# Patient Record
Sex: FEMALE | Race: OTHER | HISPANIC OR LATINO | URBAN - METROPOLITAN AREA
[De-identification: names, ages, dates, MRNs, and addresses within clinical notes are randomized per-mention and may not be internally consistent; named-entity substitution may affect disease eponyms.]

---

## 2019-10-02 ENCOUNTER — EMERGENCY (EMERGENCY)
Facility: HOSPITAL | Age: 34
LOS: 1 days | Discharge: HOME | End: 2019-10-02
Attending: EMERGENCY MEDICINE | Admitting: EMERGENCY MEDICINE
Payer: MEDICAID

## 2019-10-02 VITALS
OXYGEN SATURATION: 99 % | RESPIRATION RATE: 18 BRPM | TEMPERATURE: 98 F | SYSTOLIC BLOOD PRESSURE: 115 MMHG | HEART RATE: 79 BPM | DIASTOLIC BLOOD PRESSURE: 81 MMHG

## 2019-10-02 LAB — HCG UR QL: NEGATIVE — SIGNIFICANT CHANGE UP

## 2019-10-02 PROCEDURE — 73110 X-RAY EXAM OF WRIST: CPT

## 2019-10-02 PROCEDURE — 96372 THER/PROPH/DIAG INJ SC/IM: CPT

## 2019-10-02 PROCEDURE — 99284 EMERGENCY DEPT VISIT MOD MDM: CPT

## 2019-10-02 PROCEDURE — 81025 URINE PREGNANCY TEST: CPT

## 2019-10-02 PROCEDURE — 73110 X-RAY EXAM OF WRIST: CPT | Mod: 26,RT

## 2019-10-02 PROCEDURE — 99283 EMERGENCY DEPT VISIT LOW MDM: CPT | Mod: 25

## 2019-10-02 RX ORDER — ACETAMINOPHEN 500 MG
2 TABLET ORAL
Qty: 30 | Refills: 0
Start: 2019-10-02

## 2019-10-02 RX ORDER — ACETAMINOPHEN 500 MG
650 TABLET ORAL ONCE
Refills: 0 | Status: COMPLETED | OUTPATIENT
Start: 2019-10-02 | End: 2019-10-02

## 2019-10-02 RX ORDER — KETOROLAC TROMETHAMINE 30 MG/ML
30 SYRINGE (ML) INJECTION ONCE
Refills: 0 | Status: DISCONTINUED | OUTPATIENT
Start: 2019-10-02 | End: 2019-10-02

## 2019-10-02 RX ORDER — IBUPROFEN 200 MG
600 TABLET ORAL ONCE
Refills: 0 | Status: DISCONTINUED | OUTPATIENT
Start: 2019-10-02 | End: 2019-10-02

## 2019-10-02 RX ADMIN — Medication 650 MILLIGRAM(S): at 21:57

## 2019-10-02 RX ADMIN — Medication 30 MILLIGRAM(S): at 21:57

## 2019-10-02 NOTE — ED ADULT NURSE NOTE - OBJECTIVE STATEMENT
33 yr old female presents to the ED with c/o right hand pain. A+O x 4. anxious. reports that she lifted heavy boxes at work. Limited ROM in the RUE. No swelling, bleeding, or deformity noted.

## 2019-10-02 NOTE — ED PROVIDER NOTE - OBJECTIVE STATEMENT
34 yo F presents to ED c/o sudden R wrist pain x today. 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

## 2019-10-02 NOTE — ED PROVIDER NOTE - NSFOLLOWUPINSTRUCTIONS_ED_ALL_ED_FT
Follow up with ortho/hand within 1 week. Wear splint. Ice packs throughout the day. Take medication as directed. Return to the ED immediately for new or worsening symptoms.

## 2019-10-02 NOTE — ED ADULT NURSE NOTE - CHPI ED NUR SYMPTOMS NEG
no stiffness/no deformity/no tingling/no abrasion/no difficulty bearing weight/no fever/no weakness/no numbness/no bruising/no back pain

## 2019-10-02 NOTE — ED PROVIDER NOTE - PATIENT PORTAL LINK FT
You can access the FollowMyHealth Patient Portal offered by [facility] by registering at the following website: [email or web address]. By joining [employer]’s FollowMyHealth portal, you will also be able to view your health information using other applications (apps) compatible with our system.

## 2019-10-02 NOTE — ED PROVIDER NOTE - CLINICAL SUMMARY MEDICAL DECISION MAKING FREE TEXT BOX
34 yo F p/w R wrist pain--> likely tenosynovitis-- xray r/o FX, analgesia, d/c with wrist splint for ortho f/u 34 yo F p/w R wrist pain--> likely tendonitis-- xray r/o FX, analgesia, d/c with wrist splint for ortho f/u

## 2020-01-07 ENCOUNTER — EMERGENCY (EMERGENCY)
Facility: HOSPITAL | Age: 35
LOS: 1 days | Discharge: HOME | End: 2020-01-07
Attending: EMERGENCY MEDICINE | Admitting: EMERGENCY MEDICINE
Payer: MEDICAID

## 2020-01-07 VITALS
SYSTOLIC BLOOD PRESSURE: 141 MMHG | DIASTOLIC BLOOD PRESSURE: 78 MMHG | RESPIRATION RATE: 15 BRPM | TEMPERATURE: 98 F | HEART RATE: 74 BPM | OXYGEN SATURATION: 99 %

## 2020-01-07 VITALS
HEIGHT: 65 IN | HEART RATE: 76 BPM | DIASTOLIC BLOOD PRESSURE: 82 MMHG | WEIGHT: 175.05 LBS | OXYGEN SATURATION: 98 % | TEMPERATURE: 98 F | RESPIRATION RATE: 16 BRPM | SYSTOLIC BLOOD PRESSURE: 138 MMHG

## 2020-01-07 LAB
ALBUMIN SERPL ELPH-MCNC: 3.4 G/DL — SIGNIFICANT CHANGE UP (ref 3.3–5)
ALP SERPL-CCNC: 75 U/L — SIGNIFICANT CHANGE UP (ref 40–120)
ALT FLD-CCNC: 29 U/L — SIGNIFICANT CHANGE UP (ref 12–78)
ANION GAP SERPL CALC-SCNC: 7 MMOL/L — SIGNIFICANT CHANGE UP (ref 5–17)
APPEARANCE UR: CLEAR — SIGNIFICANT CHANGE UP
AST SERPL-CCNC: 22 U/L — SIGNIFICANT CHANGE UP (ref 15–37)
BASOPHILS # BLD AUTO: 0.04 K/UL — SIGNIFICANT CHANGE UP (ref 0–0.2)
BASOPHILS NFR BLD AUTO: 0.4 % — SIGNIFICANT CHANGE UP (ref 0–2)
BILIRUB SERPL-MCNC: 0.5 MG/DL — SIGNIFICANT CHANGE UP (ref 0.2–1.2)
BILIRUB UR-MCNC: NEGATIVE — SIGNIFICANT CHANGE UP
BUN SERPL-MCNC: 9 MG/DL — SIGNIFICANT CHANGE UP (ref 7–23)
CALCIUM SERPL-MCNC: 8.8 MG/DL — SIGNIFICANT CHANGE UP (ref 8.5–10.1)
CHLORIDE SERPL-SCNC: 106 MMOL/L — SIGNIFICANT CHANGE UP (ref 96–108)
CO2 SERPL-SCNC: 27 MMOL/L — SIGNIFICANT CHANGE UP (ref 22–31)
COLOR SPEC: YELLOW — SIGNIFICANT CHANGE UP
CREAT SERPL-MCNC: 0.73 MG/DL — SIGNIFICANT CHANGE UP (ref 0.5–1.3)
DIFF PNL FLD: ABNORMAL
EOSINOPHIL # BLD AUTO: 0.06 K/UL — SIGNIFICANT CHANGE UP (ref 0–0.5)
EOSINOPHIL NFR BLD AUTO: 0.6 % — SIGNIFICANT CHANGE UP (ref 0–6)
GLUCOSE SERPL-MCNC: 94 MG/DL — SIGNIFICANT CHANGE UP (ref 70–99)
GLUCOSE UR QL: NEGATIVE — SIGNIFICANT CHANGE UP
HCG SERPL-ACNC: 1384 MIU/ML — HIGH
HCT VFR BLD CALC: 43.2 % — SIGNIFICANT CHANGE UP (ref 34.5–45)
HGB BLD-MCNC: 14.3 G/DL — SIGNIFICANT CHANGE UP (ref 11.5–15.5)
IMM GRANULOCYTES NFR BLD AUTO: 0.6 % — SIGNIFICANT CHANGE UP (ref 0–1.5)
KETONES UR-MCNC: NEGATIVE — SIGNIFICANT CHANGE UP
LEUKOCYTE ESTERASE UR-ACNC: NEGATIVE — SIGNIFICANT CHANGE UP
LYMPHOCYTES # BLD AUTO: 1.67 K/UL — SIGNIFICANT CHANGE UP (ref 1–3.3)
LYMPHOCYTES # BLD AUTO: 15.3 % — SIGNIFICANT CHANGE UP (ref 13–44)
MCHC RBC-ENTMCNC: 28.9 PG — SIGNIFICANT CHANGE UP (ref 27–34)
MCHC RBC-ENTMCNC: 33.1 GM/DL — SIGNIFICANT CHANGE UP (ref 32–36)
MCV RBC AUTO: 87.4 FL — SIGNIFICANT CHANGE UP (ref 80–100)
MONOCYTES # BLD AUTO: 0.66 K/UL — SIGNIFICANT CHANGE UP (ref 0–0.9)
MONOCYTES NFR BLD AUTO: 6.1 % — SIGNIFICANT CHANGE UP (ref 2–14)
NEUTROPHILS # BLD AUTO: 8.38 K/UL — HIGH (ref 1.8–7.4)
NEUTROPHILS NFR BLD AUTO: 77 % — SIGNIFICANT CHANGE UP (ref 43–77)
NITRITE UR-MCNC: NEGATIVE — SIGNIFICANT CHANGE UP
NRBC # BLD: 0 /100 WBCS — SIGNIFICANT CHANGE UP (ref 0–0)
PH UR: 8 — SIGNIFICANT CHANGE UP (ref 5–8)
PLATELET # BLD AUTO: 253 K/UL — SIGNIFICANT CHANGE UP (ref 150–400)
POTASSIUM SERPL-MCNC: 4.1 MMOL/L — SIGNIFICANT CHANGE UP (ref 3.5–5.3)
POTASSIUM SERPL-SCNC: 4.1 MMOL/L — SIGNIFICANT CHANGE UP (ref 3.5–5.3)
PROT SERPL-MCNC: 7.4 G/DL — SIGNIFICANT CHANGE UP (ref 6–8.3)
PROT UR-MCNC: NEGATIVE — SIGNIFICANT CHANGE UP
RBC # BLD: 4.94 M/UL — SIGNIFICANT CHANGE UP (ref 3.8–5.2)
RBC # FLD: 13.1 % — SIGNIFICANT CHANGE UP (ref 10.3–14.5)
SODIUM SERPL-SCNC: 140 MMOL/L — SIGNIFICANT CHANGE UP (ref 135–145)
SP GR SPEC: 1.01 — SIGNIFICANT CHANGE UP (ref 1.01–1.02)
UROBILINOGEN FLD QL: NEGATIVE — SIGNIFICANT CHANGE UP
WBC # BLD: 10.88 K/UL — HIGH (ref 3.8–10.5)
WBC # FLD AUTO: 10.88 K/UL — HIGH (ref 3.8–10.5)

## 2020-01-07 PROCEDURE — 81001 URINALYSIS AUTO W/SCOPE: CPT

## 2020-01-07 PROCEDURE — 80053 COMPREHEN METABOLIC PANEL: CPT

## 2020-01-07 PROCEDURE — 85027 COMPLETE CBC AUTOMATED: CPT

## 2020-01-07 PROCEDURE — 76801 OB US < 14 WKS SINGLE FETUS: CPT | Mod: 26

## 2020-01-07 PROCEDURE — 76802 OB US < 14 WKS ADDL FETUS: CPT

## 2020-01-07 PROCEDURE — 36415 COLL VENOUS BLD VENIPUNCTURE: CPT

## 2020-01-07 PROCEDURE — 86900 BLOOD TYPING SEROLOGIC ABO: CPT

## 2020-01-07 PROCEDURE — 86901 BLOOD TYPING SEROLOGIC RH(D): CPT

## 2020-01-07 PROCEDURE — 84702 CHORIONIC GONADOTROPIN TEST: CPT

## 2020-01-07 PROCEDURE — 99284 EMERGENCY DEPT VISIT MOD MDM: CPT | Mod: 25

## 2020-01-07 PROCEDURE — 86850 RBC ANTIBODY SCREEN: CPT

## 2020-01-07 PROCEDURE — 99284 EMERGENCY DEPT VISIT MOD MDM: CPT

## 2020-01-07 NOTE — ED ADULT NURSE NOTE - OBJECTIVE STATEMENT
Pt reports LMP 10/15/19, went to OB/GYN this past Friday and had US where she was told there was no fetal HB, pt was instructed to return to OB this Wednesday however she began bleeding vaginally and cramping today.

## 2020-01-07 NOTE — ED ADULT NURSE NOTE - NSSEPSISSUSPECTED_ED_A_ED
Can try flovent then.  But would be nice if we get another notice to find out what is covered, can PA if needed to figure this out.  Roberta Beaver MD     No

## 2020-01-07 NOTE — ED PROVIDER NOTE - NSFOLLOWUPINSTRUCTIONS_ED_ALL_ED_FT
Vaya a alicia doctora manana para mas evaluacion. Regrese inmediatamente a la cosme de emergencia si tengas mas sintomas o la condicion empeora.

## 2020-01-07 NOTE — ED ADULT NURSE NOTE - NSFALLRSKASSESSTYPE_ED_ALL_ED
Initial (On Arrival) no loss of consciousness, no gait abnormality, no headache, no sensory deficits, and no weakness.

## 2020-01-07 NOTE — ED PROVIDER NOTE - PATIENT PORTAL LINK FT
You can access the FollowMyHealth Patient Portal offered by [facility] by registering at the following website: [email or web address]. By joining [employer]’s FollowMyHealth portal, you will also be able to view your health information using other applications (apps) compatible with our system.

## 2020-01-07 NOTE — ED ADULT NURSE NOTE - NSSUHOSCREENINGYN_ED_ALL_ED
Yes - the patient is able to be screened
Patient wishes to leave the emergency department at this time.  The patient understands that they are leaving against medical advice despite the risk of missing a potentially serious diagnosis which may lead to injury, disability and/or death.  The patient demonstrates understanding of all risks, is awake and alert and demonstrates competence to make medical decisions.  I was unable to convince the patient to stay for further workup and monitoring.  The patient was given an opportunity to ask any questions.   The patient understands that they may return at any time if desired.  I discussed the importance of close, prompt medical follow-up.

## 2020-01-07 NOTE — ED PROVIDER NOTE - OBJECTIVE STATEMENT
35 yo F , 8 wks gestation, LMP 10/15/19, presents to ED c/o lower abdominal cramping and vaginal bleeding worsening x today. Pt reports abdominal cramping since she found out she was pregnant but pain worse today. Reports vaginal bleeding that began as spotting 4 days ago, but has gotten progressively heavier. Had US 4 days ago which was concerning for non-viable pregnancy/no HR. Pt was instructed to return for repeat US tomorrow. Here today for worsening symptoms. Denies urinary complaints, chest pain, SOB, dizziness.

## 2020-01-07 NOTE — ED PROVIDER NOTE - PROGRESS NOTE DETAILS
Workup consistent with fetal demise/inevitable . All results discussed at length with patient and her mother. All questions answered. Pt to f/u with her OB/GYN tomorrow. Pt well appearing, stable for DC home. No emergent concerns at this time.

## 2020-08-10 ENCOUNTER — EMERGENCY (EMERGENCY)
Facility: HOSPITAL | Age: 35
LOS: 0 days | Discharge: HOME | End: 2020-08-10
Attending: EMERGENCY MEDICINE
Payer: MEDICAID

## 2020-08-10 VITALS
HEART RATE: 79 BPM | DIASTOLIC BLOOD PRESSURE: 82 MMHG | SYSTOLIC BLOOD PRESSURE: 137 MMHG | OXYGEN SATURATION: 100 % | RESPIRATION RATE: 18 BRPM

## 2020-08-10 VITALS — WEIGHT: 162.92 LBS | HEIGHT: 67 IN

## 2020-08-10 DIAGNOSIS — R10.2 PELVIC AND PERINEAL PAIN: ICD-10-CM

## 2020-08-10 DIAGNOSIS — O99.89 OTHER SPECIFIED DISEASES AND CONDITIONS COMPLICATING PREGNANCY, CHILDBIRTH AND THE PUERPERIUM: ICD-10-CM

## 2020-08-10 LAB
ALBUMIN SERPL ELPH-MCNC: 3.4 G/DL — SIGNIFICANT CHANGE UP (ref 3.3–5)
ALP SERPL-CCNC: 73 U/L — SIGNIFICANT CHANGE UP (ref 40–120)
ALT FLD-CCNC: 26 U/L — SIGNIFICANT CHANGE UP (ref 12–78)
ANION GAP SERPL CALC-SCNC: 5 MMOL/L — SIGNIFICANT CHANGE UP (ref 5–17)
APPEARANCE UR: CLEAR — SIGNIFICANT CHANGE UP
AST SERPL-CCNC: 16 U/L — SIGNIFICANT CHANGE UP (ref 15–37)
BASOPHILS # BLD AUTO: 0.04 K/UL — SIGNIFICANT CHANGE UP (ref 0–0.2)
BASOPHILS NFR BLD AUTO: 0.3 % — SIGNIFICANT CHANGE UP (ref 0–2)
BILIRUB SERPL-MCNC: 0.3 MG/DL — SIGNIFICANT CHANGE UP (ref 0.2–1.2)
BILIRUB UR-MCNC: NEGATIVE — SIGNIFICANT CHANGE UP
BUN SERPL-MCNC: 10 MG/DL — SIGNIFICANT CHANGE UP (ref 7–23)
CALCIUM SERPL-MCNC: 8.6 MG/DL — SIGNIFICANT CHANGE UP (ref 8.5–10.1)
CHLORIDE SERPL-SCNC: 108 MMOL/L — SIGNIFICANT CHANGE UP (ref 96–108)
CO2 SERPL-SCNC: 26 MMOL/L — SIGNIFICANT CHANGE UP (ref 22–31)
COLOR SPEC: YELLOW — SIGNIFICANT CHANGE UP
CREAT SERPL-MCNC: 0.78 MG/DL — SIGNIFICANT CHANGE UP (ref 0.5–1.3)
DIFF PNL FLD: NEGATIVE — SIGNIFICANT CHANGE UP
EOSINOPHIL # BLD AUTO: 0.05 K/UL — SIGNIFICANT CHANGE UP (ref 0–0.5)
EOSINOPHIL NFR BLD AUTO: 0.4 % — SIGNIFICANT CHANGE UP (ref 0–6)
GLUCOSE SERPL-MCNC: 88 MG/DL — SIGNIFICANT CHANGE UP (ref 70–99)
GLUCOSE UR QL: NEGATIVE MG/DL — SIGNIFICANT CHANGE UP
HCG SERPL-ACNC: 383 MIU/ML — HIGH
HCT VFR BLD CALC: 40 % — SIGNIFICANT CHANGE UP (ref 34.5–45)
HGB BLD-MCNC: 13.7 G/DL — SIGNIFICANT CHANGE UP (ref 11.5–15.5)
HIV 1 & 2 AB SERPL IA.RAPID: SIGNIFICANT CHANGE UP
IMM GRANULOCYTES NFR BLD AUTO: 0.3 % — SIGNIFICANT CHANGE UP (ref 0–1.5)
KETONES UR-MCNC: NEGATIVE — SIGNIFICANT CHANGE UP
LEUKOCYTE ESTERASE UR-ACNC: NEGATIVE — SIGNIFICANT CHANGE UP
LYMPHOCYTES # BLD AUTO: 17.6 % — SIGNIFICANT CHANGE UP (ref 13–44)
LYMPHOCYTES # BLD AUTO: 2.09 K/UL — SIGNIFICANT CHANGE UP (ref 1–3.3)
MCHC RBC-ENTMCNC: 29.8 PG — SIGNIFICANT CHANGE UP (ref 27–34)
MCHC RBC-ENTMCNC: 34.3 GM/DL — SIGNIFICANT CHANGE UP (ref 32–36)
MCV RBC AUTO: 87 FL — SIGNIFICANT CHANGE UP (ref 80–100)
MONOCYTES # BLD AUTO: 0.87 K/UL — SIGNIFICANT CHANGE UP (ref 0–0.9)
MONOCYTES NFR BLD AUTO: 7.3 % — SIGNIFICANT CHANGE UP (ref 2–14)
NEUTROPHILS # BLD AUTO: 8.8 K/UL — HIGH (ref 1.8–7.4)
NEUTROPHILS NFR BLD AUTO: 74.1 % — SIGNIFICANT CHANGE UP (ref 43–77)
NITRITE UR-MCNC: NEGATIVE — SIGNIFICANT CHANGE UP
PH UR: 7 — SIGNIFICANT CHANGE UP (ref 5–8)
PLATELET # BLD AUTO: 244 K/UL — SIGNIFICANT CHANGE UP (ref 150–400)
POTASSIUM SERPL-MCNC: 3.8 MMOL/L — SIGNIFICANT CHANGE UP (ref 3.5–5.3)
POTASSIUM SERPL-SCNC: 3.8 MMOL/L — SIGNIFICANT CHANGE UP (ref 3.5–5.3)
PROT SERPL-MCNC: 7.5 GM/DL — SIGNIFICANT CHANGE UP (ref 6–8.3)
PROT UR-MCNC: NEGATIVE MG/DL — SIGNIFICANT CHANGE UP
RBC # BLD: 4.6 M/UL — SIGNIFICANT CHANGE UP (ref 3.8–5.2)
RBC # FLD: 12.8 % — SIGNIFICANT CHANGE UP (ref 10.3–14.5)
SODIUM SERPL-SCNC: 139 MMOL/L — SIGNIFICANT CHANGE UP (ref 135–145)
SP GR SPEC: 1.01 — SIGNIFICANT CHANGE UP (ref 1.01–1.02)
UROBILINOGEN FLD QL: NEGATIVE MG/DL — SIGNIFICANT CHANGE UP
WBC # BLD: 11.88 K/UL — HIGH (ref 3.8–10.5)
WBC # FLD AUTO: 11.88 K/UL — HIGH (ref 3.8–10.5)

## 2020-08-10 PROCEDURE — 76830 TRANSVAGINAL US NON-OB: CPT

## 2020-08-10 PROCEDURE — 87086 URINE CULTURE/COLONY COUNT: CPT

## 2020-08-10 PROCEDURE — 85025 COMPLETE CBC W/AUTO DIFF WBC: CPT

## 2020-08-10 PROCEDURE — 86900 BLOOD TYPING SEROLOGIC ABO: CPT

## 2020-08-10 PROCEDURE — 86850 RBC ANTIBODY SCREEN: CPT

## 2020-08-10 PROCEDURE — 36415 COLL VENOUS BLD VENIPUNCTURE: CPT

## 2020-08-10 PROCEDURE — 99284 EMERGENCY DEPT VISIT MOD MDM: CPT | Mod: 25

## 2020-08-10 PROCEDURE — 81003 URINALYSIS AUTO W/O SCOPE: CPT

## 2020-08-10 PROCEDURE — 76830 TRANSVAGINAL US NON-OB: CPT | Mod: 26

## 2020-08-10 PROCEDURE — 99285 EMERGENCY DEPT VISIT HI MDM: CPT

## 2020-08-10 PROCEDURE — 84702 CHORIONIC GONADOTROPIN TEST: CPT

## 2020-08-10 PROCEDURE — 86703 HIV-1/HIV-2 1 RESULT ANTBDY: CPT

## 2020-08-10 PROCEDURE — 86901 BLOOD TYPING SEROLOGIC RH(D): CPT

## 2020-08-10 PROCEDURE — 80053 COMPREHEN METABOLIC PANEL: CPT

## 2020-08-10 RX ORDER — ACETAMINOPHEN 500 MG
975 TABLET ORAL ONCE
Refills: 0 | Status: COMPLETED | OUTPATIENT
Start: 2020-08-10 | End: 2020-08-10

## 2020-08-10 NOTE — ED PROVIDER NOTE - NS ED ROS FT
General: no fever  Head: no headache  Eyes: no vision change  ENT: no nasal discharge/congestion, no sore throat  CV: no chest pain  Resp: no SOB, no cough  GI: no N/V/D  : no dysuria, no vaginal bleeding or discharge, +pelvic pain  MSK: no joint pain  Skin: no new rash  Neuro: no focal weakness, no change in sensation

## 2020-08-10 NOTE — ED PROVIDER NOTE - CLINICAL SUMMARY MEDICAL DECISION MAKING FREE TEXT BOX
35yo woman presents with concern for ectopic pregnancy with no IUP on ultrasound during OB visit today with LMP 4 weeks ago and with bilateral lower pelvic tenderness on exam. Consider ectopic vs early pregnancy. Pt is hemodynamically stable. Will obtain repeat TVUS and hcg levels and h/h. Will give pain medication continue to monitor and reassess.

## 2020-08-10 NOTE — ED PROVIDER NOTE - PATIENT PORTAL LINK FT
You can access the FollowMyHealth Patient Portal offered by [facility] by registering at the following website: [email or web address]. By joining [employer]’s FollowMyHealth portal, you will also be able to view your health information using other applications (apps) compatible with our system.

## 2020-08-10 NOTE — ED PROVIDER NOTE - CARE PLAN
Principal Discharge DX:	Lower abdominal pain Principal Discharge DX:	Pregnancy related condition in first trimester

## 2020-08-10 NOTE — ED PROVIDER NOTE - NSFOLLOWUPINSTRUCTIONS_ED_ALL_ED_FT
You were seen an evaluated in the emergency room for lower abdominal pain while pregnant.    Please follow up with your OB/gyn (Dr. Denny) on 8/12 for repeat blood work.    Take 650mg tylenol every 6 hours as needed for pain.    Continue all home medications as prescribed.    Seek care immediately if:   You have severe pain in your abdomen.   Your abdomen is larger than usual, very painful, and hard.   You have heavy vaginal bleeding.  You have chest pain or shortness of breath.  You feel weak, dizzy, or faint.  Or any worse or abnormal symptoms.     Please read all attached.

## 2020-08-10 NOTE — ED ADULT TRIAGE NOTE - CHIEF COMPLAINT QUOTE
pt sent in by md for possible ectopic pregnancy, pt states she is 8 weeks pregnant, abd pain started 4 days ago.  Denies vaginal bleeding.

## 2020-08-10 NOTE — ED PROVIDER NOTE - PHYSICAL EXAMINATION
General: well-appearing woman in no acute distress  Head: normocephalic, atraumatic  Eyes: PERRL  Neck: supple neck  CV: normal rate and rhythm, peripheral pulses 2+ bilaterally  Respiratory: clear to auscultation bilaterally  Abdomen: soft, nontender, nondistended  : tender to palpation of bilateral lower pelvic region, no CVAT  Neuro: alert and oriented x3, speech clear, moving all extremities without difficulty  Skin: no rash on abdomen  Extremities: no tenderness to palpation of joints

## 2020-08-10 NOTE — ED PROVIDER NOTE - CARE PROVIDER_API CALL
Lona Denny  OBSTETRICS AND GYNECOLOGY  [address] [place] [address]  Phone: [phone]  Fax: [phone]  Scheduled Appointment: 08/12/2020

## 2020-08-10 NOTE — ED PROVIDER NOTE - OBJECTIVE STATEMENT
33yo  LMP 20 with +home pregnancy test 2 days ago and evaluation with ultrasound today at OB office (Dr. Denny) with no IUP sent in for evaluation of ectopic pregnancy in setting of bilateral lower abdominal cramping pain x 4 days that worsens with intercourse. No vaginal bleeding or discharge. No n/v/d, no blood in stool. No chest pain or SOB, no lightheadedness. No dysuria. She took tylenol 4 days ago for the pain.   ID# [id number]

## 2020-08-10 NOTE — ED PROVIDER NOTE - PROGRESS NOTE DETAILS
Shahid Puga for attending Dr. Briseno: First trimester vaginal bleeding - hemodynamically stable & well appearing (other DDx: most likely threatened , other , implantation bleeding vs less likely torsion or molar pregnancy)  Plan: 1) LABS/UA/HCG/T&S.  2) Transvaginal US.  3) reassess.  4) OBGYN consult if needed. Kya Alonso, resident MD: hcg 383 and no IUP or finding on ultrasound. spoke with Dr. Denny and discussed results vitals wnl. She will see pt in office in 2 days for repeat hcg. will discharge patient home at this time. printed out copies of results for patient to take home. discussed return precautions and need for outpatient follow up. Kya Alonso, resident MD: hcg 383 and no IUP or finding on ultrasound. discussed results with pt. pt states resolution of abdominal pain at this time. spoke with Dr. Denny and discussed results and vitals wnl. She will see pt in office in 2 days for repeat hcg. will discharge patient home at this time. printed out copies of results for patient to take home. discussed return precautions and need for outpatient follow up.

## 2020-08-10 NOTE — ED ADULT NURSE NOTE - OBJECTIVE STATEMENT
pt sent in by md for possible ectopic pregnancy, pt states she is 8 weeks pregnant, abd pain started 4 days ago.  Denies vaginal bleeding. pain subsided on arrival.

## 2020-08-11 LAB
CULTURE RESULTS: SIGNIFICANT CHANGE UP
SPECIMEN SOURCE: SIGNIFICANT CHANGE UP

## 2020-10-12 ENCOUNTER — ASOB RESULT (OUTPATIENT)
Age: 35
End: 2020-10-12

## 2020-10-12 ENCOUNTER — APPOINTMENT (OUTPATIENT)
Dept: ANTEPARTUM | Facility: CLINIC | Age: 35
End: 2020-10-12
Payer: MEDICAID

## 2020-10-12 DIAGNOSIS — O35.1XX1 MATERNAL CARE FOR (SUSPECTED) CHROMOSOMAL ABNORMALITY IN FETUS, FETUS 1: ICD-10-CM

## 2020-10-12 PROCEDURE — 76813 OB US NUCHAL MEAS 1 GEST: CPT | Mod: 59

## 2020-10-12 PROCEDURE — 36416 COLLJ CAPILLARY BLOOD SPEC: CPT

## 2020-10-15 LAB
1ST TRIMESTER DATA: NORMAL
ADDENDUM DOC: NORMAL
AFP PNL SERPL: NORMAL
AFP SERPL-ACNC: NORMAL
CLINICAL BIOCHEMIST REVIEW: NORMAL
FREE BETA HCG 1ST TRIMESTER: NORMAL
Lab: NORMAL
NOTES NTD: NORMAL
NT: NORMAL
PAPP-A SERPL-ACNC: NORMAL
TRISOMY 18/3: NORMAL

## 2020-12-07 ENCOUNTER — APPOINTMENT (OUTPATIENT)
Dept: ANTEPARTUM | Facility: CLINIC | Age: 35
End: 2020-12-07
Payer: MEDICAID

## 2020-12-07 ENCOUNTER — ASOB RESULT (OUTPATIENT)
Age: 35
End: 2020-12-07

## 2020-12-07 PROCEDURE — 76805 OB US >/= 14 WKS SNGL FETUS: CPT

## 2020-12-07 PROCEDURE — 99072 ADDL SUPL MATRL&STAF TM PHE: CPT

## 2020-12-21 ENCOUNTER — ASOB RESULT (OUTPATIENT)
Age: 35
End: 2020-12-21

## 2020-12-21 ENCOUNTER — APPOINTMENT (OUTPATIENT)
Dept: ANTEPARTUM | Facility: CLINIC | Age: 35
End: 2020-12-21
Payer: MEDICAID

## 2020-12-21 PROCEDURE — 99072 ADDL SUPL MATRL&STAF TM PHE: CPT

## 2020-12-21 PROCEDURE — 76816 OB US FOLLOW-UP PER FETUS: CPT

## 2021-01-13 ENCOUNTER — EMERGENCY (EMERGENCY)
Facility: HOSPITAL | Age: 36
LOS: 0 days | Discharge: HOME | End: 2021-01-13
Attending: EMERGENCY MEDICINE
Payer: MEDICAID

## 2021-01-13 VITALS
DIASTOLIC BLOOD PRESSURE: 67 MMHG | OXYGEN SATURATION: 99 % | TEMPERATURE: 98 F | HEART RATE: 82 BPM | SYSTOLIC BLOOD PRESSURE: 120 MMHG | RESPIRATION RATE: 16 BRPM

## 2021-01-13 VITALS
HEART RATE: 90 BPM | DIASTOLIC BLOOD PRESSURE: 65 MMHG | SYSTOLIC BLOOD PRESSURE: 114 MMHG | TEMPERATURE: 99 F | RESPIRATION RATE: 18 BRPM | OXYGEN SATURATION: 100 %

## 2021-01-13 DIAGNOSIS — Z3A.26 26 WEEKS GESTATION OF PREGNANCY: ICD-10-CM

## 2021-01-13 DIAGNOSIS — R55 SYNCOPE AND COLLAPSE: ICD-10-CM

## 2021-01-13 LAB
ALBUMIN SERPL ELPH-MCNC: 2.5 G/DL — LOW (ref 3.3–5)
ALP SERPL-CCNC: 79 U/L — SIGNIFICANT CHANGE UP (ref 40–120)
ALT FLD-CCNC: 25 U/L — SIGNIFICANT CHANGE UP (ref 12–78)
ANION GAP SERPL CALC-SCNC: 5 MMOL/L — SIGNIFICANT CHANGE UP (ref 5–17)
APPEARANCE UR: CLEAR — SIGNIFICANT CHANGE UP
AST SERPL-CCNC: 36 U/L — SIGNIFICANT CHANGE UP (ref 15–37)
BASOPHILS # BLD AUTO: 0.04 K/UL — SIGNIFICANT CHANGE UP (ref 0–0.2)
BASOPHILS NFR BLD AUTO: 0.4 % — SIGNIFICANT CHANGE UP (ref 0–2)
BILIRUB SERPL-MCNC: 0.3 MG/DL — SIGNIFICANT CHANGE UP (ref 0.2–1.2)
BILIRUB UR-MCNC: NEGATIVE — SIGNIFICANT CHANGE UP
BUN SERPL-MCNC: 6 MG/DL — LOW (ref 7–23)
CALCIUM SERPL-MCNC: 8.2 MG/DL — LOW (ref 8.5–10.1)
CHLORIDE SERPL-SCNC: 110 MMOL/L — HIGH (ref 96–108)
CO2 SERPL-SCNC: 22 MMOL/L — SIGNIFICANT CHANGE UP (ref 22–31)
COLOR SPEC: YELLOW — SIGNIFICANT CHANGE UP
CREAT SERPL-MCNC: 0.61 MG/DL — SIGNIFICANT CHANGE UP (ref 0.5–1.3)
DIFF PNL FLD: NEGATIVE — SIGNIFICANT CHANGE UP
EOSINOPHIL # BLD AUTO: 0.07 K/UL — SIGNIFICANT CHANGE UP (ref 0–0.5)
EOSINOPHIL NFR BLD AUTO: 0.6 % — SIGNIFICANT CHANGE UP (ref 0–6)
GLUCOSE SERPL-MCNC: 81 MG/DL — SIGNIFICANT CHANGE UP (ref 70–99)
GLUCOSE UR QL: NEGATIVE MG/DL — SIGNIFICANT CHANGE UP
HCT VFR BLD CALC: 39.3 % — SIGNIFICANT CHANGE UP (ref 34.5–45)
HGB BLD-MCNC: 12.9 G/DL — SIGNIFICANT CHANGE UP (ref 11.5–15.5)
IMM GRANULOCYTES NFR BLD AUTO: 0.5 % — SIGNIFICANT CHANGE UP (ref 0–1.5)
KETONES UR-MCNC: NEGATIVE — SIGNIFICANT CHANGE UP
LEUKOCYTE ESTERASE UR-ACNC: NEGATIVE — SIGNIFICANT CHANGE UP
LYMPHOCYTES # BLD AUTO: 1.76 K/UL — SIGNIFICANT CHANGE UP (ref 1–3.3)
LYMPHOCYTES # BLD AUTO: 16 % — SIGNIFICANT CHANGE UP (ref 13–44)
MANUAL SMEAR VERIFICATION: SIGNIFICANT CHANGE UP
MCHC RBC-ENTMCNC: 28.6 PG — SIGNIFICANT CHANGE UP (ref 27–34)
MCHC RBC-ENTMCNC: 32.8 GM/DL — SIGNIFICANT CHANGE UP (ref 32–36)
MCV RBC AUTO: 87.1 FL — SIGNIFICANT CHANGE UP (ref 80–100)
MONOCYTES # BLD AUTO: 0.9 K/UL — SIGNIFICANT CHANGE UP (ref 0–0.9)
MONOCYTES NFR BLD AUTO: 8.2 % — SIGNIFICANT CHANGE UP (ref 2–14)
NEUTROPHILS # BLD AUTO: 8.15 K/UL — HIGH (ref 1.8–7.4)
NEUTROPHILS NFR BLD AUTO: 74.3 % — SIGNIFICANT CHANGE UP (ref 43–77)
NITRITE UR-MCNC: NEGATIVE — SIGNIFICANT CHANGE UP
PH UR: 6.5 — SIGNIFICANT CHANGE UP (ref 5–8)
PLAT MORPH BLD: NORMAL — SIGNIFICANT CHANGE UP
PLATELET # BLD AUTO: 200 K/UL — SIGNIFICANT CHANGE UP (ref 150–400)
POTASSIUM SERPL-MCNC: 4.3 MMOL/L — SIGNIFICANT CHANGE UP (ref 3.5–5.3)
POTASSIUM SERPL-SCNC: 4.3 MMOL/L — SIGNIFICANT CHANGE UP (ref 3.5–5.3)
PROT SERPL-MCNC: 6.9 GM/DL — SIGNIFICANT CHANGE UP (ref 6–8.3)
PROT UR-MCNC: NEGATIVE MG/DL — SIGNIFICANT CHANGE UP
RBC # BLD: 4.51 M/UL — SIGNIFICANT CHANGE UP (ref 3.8–5.2)
RBC # FLD: 13.9 % — SIGNIFICANT CHANGE UP (ref 10.3–14.5)
RBC BLD AUTO: NORMAL — SIGNIFICANT CHANGE UP
SODIUM SERPL-SCNC: 137 MMOL/L — SIGNIFICANT CHANGE UP (ref 135–145)
SP GR SPEC: 1.01 — SIGNIFICANT CHANGE UP (ref 1.01–1.02)
UROBILINOGEN FLD QL: NEGATIVE MG/DL — SIGNIFICANT CHANGE UP
WBC # BLD: 10.98 K/UL — HIGH (ref 3.8–10.5)
WBC # FLD AUTO: 10.98 K/UL — HIGH (ref 3.8–10.5)

## 2021-01-13 PROCEDURE — 93005 ELECTROCARDIOGRAM TRACING: CPT

## 2021-01-13 PROCEDURE — 80053 COMPREHEN METABOLIC PANEL: CPT

## 2021-01-13 PROCEDURE — 81003 URINALYSIS AUTO W/O SCOPE: CPT

## 2021-01-13 PROCEDURE — 99283 EMERGENCY DEPT VISIT LOW MDM: CPT

## 2021-01-13 PROCEDURE — 85025 COMPLETE CBC W/AUTO DIFF WBC: CPT

## 2021-01-13 PROCEDURE — 93010 ELECTROCARDIOGRAM REPORT: CPT

## 2021-01-13 PROCEDURE — 99285 EMERGENCY DEPT VISIT HI MDM: CPT

## 2021-01-13 PROCEDURE — 36415 COLL VENOUS BLD VENIPUNCTURE: CPT

## 2021-01-13 RX ORDER — SODIUM CHLORIDE 9 MG/ML
1000 INJECTION INTRAMUSCULAR; INTRAVENOUS; SUBCUTANEOUS ONCE
Refills: 0 | Status: COMPLETED | OUTPATIENT
Start: 2021-01-13 | End: 2021-01-13

## 2021-01-13 RX ADMIN — SODIUM CHLORIDE 1000 MILLILITER(S): 9 INJECTION INTRAMUSCULAR; INTRAVENOUS; SUBCUTANEOUS at 18:15

## 2021-01-13 NOTE — ED STATDOCS - GASTROINTESTINAL, MLM
abdomen soft, non-tender, and non-distended. Bowel sounds present. abdomen soft, non-tender, and non-distended. Bowel sounds present. +gravid abdomen

## 2021-01-13 NOTE — ED STATDOCS - NSFOLLOWUPINSTRUCTIONS_ED_ALL_ED_FT
Síncope    Syncope    Un síncope es cuando elena persona se desvanece (desmaya) eric un corto tiempo. La causa del síncope es elena disminución súbita del flujo de lexus al cerebro. Los signos de que alguien está por desmayarse incluyen lo siguiente:  •Sentirse mareado o aturdido.      •Ganas de vomitar (náuseas).      •Trinity todo hernandez o joshua.      •Tener la piel fría y húmeda.      Si se desmaya, solicite ayuda de inmediato. Comuníquese con el servicio de emergencias de alicia localidad (911 en los [place]). No conduzca por max propios medios hasta el hospital.      Siga estas indicaciones en alicia casa:    Controle si hay algún cambio en max síntomas. Para mantener alicia seguridad y ayudar con los síntomas, tome estas medidas:    Estilo de vita     • No conduzca vehículos, no use maquinarias ni practique deportes hasta que el médico lo autorice.      • No jesse alcohol.      • No consuma ningún producto que contenga nicotina o tabaco, sofia cigarrillos y cigarrillos electrónicos. Si necesita ayuda para dejar de fumar, consulte al médico.      •Jesse suficiente líquido para mantener la orina de color amarillo pálido.      Indicaciones generales     •[place] los medicamentos de venta elder y los recetados solamente sofia se lo haya indicado el médico.      •Si shekhar medicamentos para la presión arterial o para el corazón, levántese y siéntese lentamente. Dedique unos minutos a prepararse para sentarse y después levantarse. [place] puede ayudarlo a sentirse menos mareado.      •Pídale a alguien que se quede con usted hasta que se sienta estable.      •Si comienza a sentir que podría desmayarse, recuéstese de inmediato y levante (eleve) los pies por encima del nivel del corazón. Respire profundamente y de manera continua. Espere hasta que los síntomas hayan desaparecido.      •Concurra a todas las visitas de seguimiento sofia se lo haya indicado el médico. [place] es importante.        Solicite ayuda de inmediato si:    •Tiene un dolor de esdras muy intenso.      •Se desmaya elena o más veces.      •Siente dolor en el pecho, el abdomen o la espalda.      •Tiene latidos cardíacos muy rápidos o irregulares (palpitaciones).      •Le duele al respirar.      •Le sangran la boca o las nalgas (recto).      •La materia fecal (heces) es mary o de aspecto alquitranado.      •Tiene elena crisis de movimientos que no puede controlar (convulsiones).      •Se siente confundido.      •Presenta dificultad para caminar.      •Se siente muy débil.      •Tiene problemas de visión.      Estos síntomas pueden indicar elena emergencia. No espere a trinity si los síntomas desaparecen. Solicite atención médica de inmediato. Comuníquese con el servicio de emergencias de alicia localidad (911 en los [place]). No conduzca por max propios medios hasta el hospital.       Resumen    •Un síncope es cuando elena persona se desvanece (desmaya) eric un corto tiempo. La causa del síncope es elena disminución súbita del flujo de lexus al cerebro.      •Los signos de que podría estar por desmayarse incluyen sentirse mareado o aturdido, tener ganas de vomitar, verlo todo hernandez o joshua, o tener la piel fría y húmeda.      •Si comienza a sentir que podría desmayarse, recuéstese de inmediato y levante (eleve) los pies por encima del nivel del corazón. Respire profundamente y de manera continua. Espere hasta que los síntomas hayan desaparecido.      Esta información no tiene sofia fin reemplazar el consejo del médico. Asegúrese de hacerle al médico cualquier pregunta que tenga.

## 2021-01-13 NOTE — ED STATDOCS - SKIN, MLM
skin normal color for race, warm, dry and intact. skin normal color for race, warm, dry and intact. No LE edema.

## 2021-01-13 NOTE — ED STATDOCS - OBJECTIVE STATEMENT
36 y/o F presents ambulatory to the ED s/p +syncope this AM. Reports feeling +abd pain and +SOB prior to syncopizing. No tongue bite, incontinence, nausea, vomiting, or fever. NKDA. Pt is Uzbek speaking, [place]  used, ID#: [id number] 36 y/o F 26 weeks pregnant presents ambulatory to the ED s/p +syncope this AM. Reports feeling +abd pain and +SOB prior to syncopizing. No tongue bite, incontinence, nausea, vomiting, or fever. Reports feeling some +dizziness again this afternoon but did not syncopize again. NKDA. Pt is Salvadorean speaking, [place]  used, ID#: [id number]

## 2021-01-13 NOTE — ED STATDOCS - CARE PLAN
Principal Discharge DX:	Syncope, unspecified syncope type  Secondary Diagnosis:	26 weeks gestation of pregnancy

## 2021-01-13 NOTE — ED ADULT NURSE NOTE - OBJECTIVE STATEMENT
pt came to ED for evaluation of suspected syncope at home. 26 weeks pregnant. c/o dizziness. denies any other complaints.

## 2021-01-13 NOTE — ED STATDOCS - PATIENT PORTAL LINK FT
You can access the FollowMyHealth Patient Portal offered by [facility] by registering at the following website: [email or web address]. By joining [employer]’s FollowMyHealth portal, you will also be able to view your health information using other applications (apps) compatible with our system.

## 2021-01-13 NOTE — ED STATDOCS - CLINICAL SUMMARY MEDICAL DECISION MAKING FREE TEXT BOX
Pt well appearing, VSS. Appears to have had vasovagal syncope, no current chest pain or SOB to suggest PE or ACS. EKG unremarkable. Still feels slight lightheadedness. Will give fluids, check basic labs, reassess. Anticipate d/c home Pt well appearing, AVSS. Appears to have had vasovagal syncope, no current chest pain or SOB to suggest PE or ACS. EKG unremarkable. Still feels slight lightheadedness. Will give fluids, check basic labs, reassess. Anticipate d/c home

## 2021-01-13 NOTE — ED ADULT NURSE NOTE - NSIMPLEMENTINTERV_GEN_ALL_ED
Implemented All Universal Safety Interventions:  [place] to call system. Call bell, personal items and telephone within reach. Instruct patient to call for assistance. Room bathroom lighting operational. Non-slip footwear when patient is off stretcher. Physically safe environment: no spills, clutter or unnecessary equipment. Stretcher in lowest position, wheels locked, appropriate side rails in place.

## 2021-01-13 NOTE — ED STATDOCS - PROGRESS NOTE DETAILS
labs WNL, pt feeling well, denies SOB, CP, will d/c home with outpt f/u with OB tmrw, pt agreeable to d/c and plan of care, all questions answered, return precautions given labs WNL, pt feeling well, denies SOB, CP, will d/c home with outpt f/u with OB tmrw, pt agreeable to d/c and plan of care, all questions answered, return precautions given.   [id number] Bedside US with FHR of 141.  Pt well appearing.  EKG unremarkable.  Labs unremarkable.  AVSS.  Stable for d/c home with strict return precautions.

## 2021-01-13 NOTE — ED ADULT TRIAGE NOTE - CHIEF COMPLAINT QUOTE
Pt states she is 25 weeks pregnant, pt of Dr hoover. pt synopsized this Am and has dizziness with headache. pt recently dx with gestational diabetes pt denies chest pain SOb weakness, no fevers pelvic pain or vaginal bleeding/ pt to be seen in ED then sent to labor and delivery for NST

## 2021-03-01 ENCOUNTER — ASOB RESULT (OUTPATIENT)
Age: 36
End: 2021-03-01

## 2021-03-01 ENCOUNTER — APPOINTMENT (OUTPATIENT)
Dept: ANTEPARTUM | Facility: CLINIC | Age: 36
End: 2021-03-01
Payer: MEDICAID

## 2021-03-01 PROBLEM — K64.5 PERIANAL VENOUS THROMBOSIS: Chronic | Status: ACTIVE | Noted: 2021-01-13

## 2021-03-01 PROCEDURE — 76819 FETAL BIOPHYS PROFIL W/O NST: CPT

## 2021-03-01 PROCEDURE — 99072 ADDL SUPL MATRL&STAF TM PHE: CPT

## 2021-03-01 PROCEDURE — 76816 OB US FOLLOW-UP PER FETUS: CPT

## 2021-03-22 ENCOUNTER — ASOB RESULT (OUTPATIENT)
Age: 36
End: 2021-03-22

## 2021-03-22 ENCOUNTER — APPOINTMENT (OUTPATIENT)
Dept: ANTEPARTUM | Facility: CLINIC | Age: 36
End: 2021-03-22
Payer: MEDICAID

## 2021-03-22 PROCEDURE — 76819 FETAL BIOPHYS PROFIL W/O NST: CPT

## 2021-03-22 PROCEDURE — 76820 UMBILICAL ARTERY ECHO: CPT

## 2021-03-22 PROCEDURE — 99072 ADDL SUPL MATRL&STAF TM PHE: CPT

## 2021-03-22 PROCEDURE — 76816 OB US FOLLOW-UP PER FETUS: CPT

## 2021-03-25 ENCOUNTER — APPOINTMENT (OUTPATIENT)
Dept: ANTEPARTUM | Facility: CLINIC | Age: 36
End: 2021-03-25
Payer: MEDICAID

## 2021-03-25 ENCOUNTER — ASOB RESULT (OUTPATIENT)
Age: 36
End: 2021-03-25

## 2021-03-25 PROCEDURE — 76819 FETAL BIOPHYS PROFIL W/O NST: CPT

## 2021-03-25 PROCEDURE — 99072 ADDL SUPL MATRL&STAF TM PHE: CPT

## 2021-03-29 ENCOUNTER — ASOB RESULT (OUTPATIENT)
Age: 36
End: 2021-03-29

## 2021-03-29 ENCOUNTER — APPOINTMENT (OUTPATIENT)
Dept: ANTEPARTUM | Facility: CLINIC | Age: 36
End: 2021-03-29
Payer: MEDICAID

## 2021-03-29 PROCEDURE — 99072 ADDL SUPL MATRL&STAF TM PHE: CPT

## 2021-03-29 PROCEDURE — 76819 FETAL BIOPHYS PROFIL W/O NST: CPT

## 2021-04-01 ENCOUNTER — ASOB RESULT (OUTPATIENT)
Age: 36
End: 2021-04-01

## 2021-04-01 ENCOUNTER — APPOINTMENT (OUTPATIENT)
Dept: ANTEPARTUM | Facility: CLINIC | Age: 36
End: 2021-04-01
Payer: MEDICAID

## 2021-04-01 PROCEDURE — 99072 ADDL SUPL MATRL&STAF TM PHE: CPT

## 2021-04-01 PROCEDURE — 76819 FETAL BIOPHYS PROFIL W/O NST: CPT

## 2021-04-05 ENCOUNTER — APPOINTMENT (OUTPATIENT)
Dept: ANTEPARTUM | Facility: CLINIC | Age: 36
End: 2021-04-05
Payer: MEDICAID

## 2021-04-05 ENCOUNTER — ASOB RESULT (OUTPATIENT)
Age: 36
End: 2021-04-05

## 2021-04-05 PROCEDURE — 76819 FETAL BIOPHYS PROFIL W/O NST: CPT

## 2021-04-05 PROCEDURE — 99072 ADDL SUPL MATRL&STAF TM PHE: CPT

## 2021-04-05 PROCEDURE — 76820 UMBILICAL ARTERY ECHO: CPT

## 2021-04-08 ENCOUNTER — APPOINTMENT (OUTPATIENT)
Dept: ANTEPARTUM | Facility: CLINIC | Age: 36
End: 2021-04-08
Payer: MEDICAID

## 2021-04-08 ENCOUNTER — ASOB RESULT (OUTPATIENT)
Age: 36
End: 2021-04-08

## 2021-04-08 PROCEDURE — 76820 UMBILICAL ARTERY ECHO: CPT

## 2021-04-08 PROCEDURE — 76819 FETAL BIOPHYS PROFIL W/O NST: CPT

## 2021-04-08 PROCEDURE — 99072 ADDL SUPL MATRL&STAF TM PHE: CPT

## 2021-04-13 ENCOUNTER — APPOINTMENT (OUTPATIENT)
Dept: ANTEPARTUM | Facility: CLINIC | Age: 36
End: 2021-04-13

## 2021-04-15 ENCOUNTER — INPATIENT (INPATIENT)
Facility: HOSPITAL | Age: 36
LOS: 2 days | Discharge: HOME | DRG: 540 | End: 2021-04-18
Attending: OBSTETRICS & GYNECOLOGY | Admitting: OBSTETRICS & GYNECOLOGY
Payer: MEDICAID

## 2021-04-15 VITALS — WEIGHT: 187.39 LBS

## 2021-04-15 DIAGNOSIS — Z86.16 PERSONAL HISTORY OF COVID-19: ICD-10-CM

## 2021-04-15 DIAGNOSIS — Z3A.39 39 WEEKS GESTATION OF PREGNANCY: ICD-10-CM

## 2021-04-15 DIAGNOSIS — O26.899 OTHER SPECIFIED PREGNANCY RELATED CONDITIONS, UNSPECIFIED TRIMESTER: ICD-10-CM

## 2021-04-15 LAB
BASOPHILS # BLD AUTO: 0.05 K/UL — SIGNIFICANT CHANGE UP (ref 0–0.2)
BASOPHILS NFR BLD AUTO: 0.3 % — SIGNIFICANT CHANGE UP (ref 0–2)
COVID-19 SPIKE DOMAIN AB INTERP: POSITIVE
COVID-19 SPIKE DOMAIN ANTIBODY RESULT: 89.1 U/ML — HIGH
EOSINOPHIL # BLD AUTO: 0.07 K/UL — SIGNIFICANT CHANGE UP (ref 0–0.5)
EOSINOPHIL NFR BLD AUTO: 0.4 % — SIGNIFICANT CHANGE UP (ref 0–6)
HCT VFR BLD CALC: 41.8 % — SIGNIFICANT CHANGE UP (ref 34.5–45)
HGB BLD-MCNC: 13.8 G/DL — SIGNIFICANT CHANGE UP (ref 11.5–15.5)
IMM GRANULOCYTES NFR BLD AUTO: 0.5 % — SIGNIFICANT CHANGE UP (ref 0–1.5)
LYMPHOCYTES # BLD AUTO: 14.3 % — SIGNIFICANT CHANGE UP (ref 13–44)
LYMPHOCYTES # BLD AUTO: 2.24 K/UL — SIGNIFICANT CHANGE UP (ref 1–3.3)
MCHC RBC-ENTMCNC: 27.6 PG — SIGNIFICANT CHANGE UP (ref 27–34)
MCHC RBC-ENTMCNC: 33 GM/DL — SIGNIFICANT CHANGE UP (ref 32–36)
MCV RBC AUTO: 83.6 FL — SIGNIFICANT CHANGE UP (ref 80–100)
MONOCYTES # BLD AUTO: 1.29 K/UL — HIGH (ref 0–0.9)
MONOCYTES NFR BLD AUTO: 8.2 % — SIGNIFICANT CHANGE UP (ref 2–14)
NEUTROPHILS # BLD AUTO: 11.93 K/UL — HIGH (ref 1.8–7.4)
NEUTROPHILS NFR BLD AUTO: 76.3 % — SIGNIFICANT CHANGE UP (ref 43–77)
PLATELET # BLD AUTO: 249 K/UL — SIGNIFICANT CHANGE UP (ref 150–400)
RBC # BLD: 5 M/UL — SIGNIFICANT CHANGE UP (ref 3.8–5.2)
RBC # FLD: 15.1 % — HIGH (ref 10.3–14.5)
SARS-COV-2 IGG+IGM SERPL QL IA: 89.1 U/ML — HIGH
SARS-COV-2 IGG+IGM SERPL QL IA: POSITIVE
SARS-COV-2 RNA SPEC QL NAA+PROBE: SIGNIFICANT CHANGE UP
T PALLIDUM AB TITR SER: NEGATIVE — SIGNIFICANT CHANGE UP
WBC # BLD: 15.66 K/UL — HIGH (ref 3.8–10.5)
WBC # FLD AUTO: 15.66 K/UL — HIGH (ref 3.8–10.5)

## 2021-04-15 PROCEDURE — 36415 COLL VENOUS BLD VENIPUNCTURE: CPT

## 2021-04-15 PROCEDURE — 86769 SARS-COV-2 COVID-19 ANTIBODY: CPT

## 2021-04-15 PROCEDURE — C1889: CPT

## 2021-04-15 PROCEDURE — U0003: CPT

## 2021-04-15 PROCEDURE — 90715 TDAP VACCINE 7 YRS/> IM: CPT

## 2021-04-15 PROCEDURE — G0463: CPT

## 2021-04-15 PROCEDURE — 59050 FETAL MONITOR W/REPORT: CPT

## 2021-04-15 PROCEDURE — 86780 TREPONEMA PALLIDUM: CPT

## 2021-04-15 PROCEDURE — 85025 COMPLETE CBC W/AUTO DIFF WBC: CPT

## 2021-04-15 PROCEDURE — 94760 N-INVAS EAR/PLS OXIMETRY 1: CPT

## 2021-04-15 PROCEDURE — 86900 BLOOD TYPING SEROLOGIC ABO: CPT

## 2021-04-15 PROCEDURE — 86901 BLOOD TYPING SEROLOGIC RH(D): CPT

## 2021-04-15 PROCEDURE — U0005: CPT

## 2021-04-15 PROCEDURE — 86850 RBC ANTIBODY SCREEN: CPT

## 2021-04-15 RX ORDER — ONDANSETRON 8 MG/1
4 TABLET, FILM COATED ORAL EVERY 6 HOURS
Refills: 0 | Status: DISCONTINUED | OUTPATIENT
Start: 2021-04-15 | End: 2021-04-18

## 2021-04-15 RX ORDER — MORPHINE SULFATE 50 MG/1
2 CAPSULE, EXTENDED RELEASE ORAL ONCE
Refills: 0 | Status: DISCONTINUED | OUTPATIENT
Start: 2021-04-15 | End: 2021-04-18

## 2021-04-15 RX ORDER — OXYCODONE HYDROCHLORIDE 5 MG/1
5 TABLET ORAL
Refills: 0 | Status: DISCONTINUED | OUTPATIENT
Start: 2021-04-15 | End: 2021-04-18

## 2021-04-15 RX ORDER — TETANUS TOXOID, REDUCED DIPHTHERIA TOXOID AND ACELLULAR PERTUSSIS VACCINE, ADSORBED 5; 2.5; 8; 8; 2.5 [IU]/.5ML; [IU]/.5ML; UG/.5ML; UG/.5ML; UG/.5ML
0.5 SUSPENSION INTRAMUSCULAR ONCE
Refills: 0 | Status: COMPLETED | OUTPATIENT
Start: 2021-04-15

## 2021-04-15 RX ORDER — OXYTOCIN 10 UNIT/ML
333.33 VIAL (ML) INJECTION
Qty: 20 | Refills: 0 | Status: DISCONTINUED | OUTPATIENT
Start: 2021-04-15 | End: 2021-04-18

## 2021-04-15 RX ORDER — OXYCODONE HYDROCHLORIDE 5 MG/1
10 TABLET ORAL
Refills: 0 | Status: DISCONTINUED | OUTPATIENT
Start: 2021-04-15 | End: 2021-04-18

## 2021-04-15 RX ORDER — SODIUM CHLORIDE 9 MG/ML
1000 INJECTION, SOLUTION INTRAVENOUS
Refills: 0 | Status: DISCONTINUED | OUTPATIENT
Start: 2021-04-15 | End: 2021-04-18

## 2021-04-15 RX ORDER — OXYTOCIN 10 UNIT/ML
2 VIAL (ML) INJECTION
Qty: 30 | Refills: 0 | Status: DISCONTINUED | OUTPATIENT
Start: 2021-04-15 | End: 2021-04-18

## 2021-04-15 RX ORDER — ACETAMINOPHEN 500 MG
975 TABLET ORAL
Refills: 0 | Status: DISCONTINUED | OUTPATIENT
Start: 2021-04-15 | End: 2021-04-18

## 2021-04-15 RX ORDER — MAGNESIUM HYDROXIDE 400 MG/1
30 TABLET, CHEWABLE ORAL
Refills: 0 | Status: DISCONTINUED | OUTPATIENT
Start: 2021-04-15 | End: 2021-04-18

## 2021-04-15 RX ORDER — LANOLIN
1 OINTMENT (GRAM) TOPICAL EVERY 6 HOURS
Refills: 0 | Status: DISCONTINUED | OUTPATIENT
Start: 2021-04-15 | End: 2021-04-18

## 2021-04-15 RX ORDER — KETOROLAC TROMETHAMINE 30 MG/ML
30 SYRINGE (ML) INJECTION EVERY 6 HOURS
Refills: 0 | Status: DISCONTINUED | OUTPATIENT
Start: 2021-04-15 | End: 2021-04-16

## 2021-04-15 RX ORDER — SODIUM CHLORIDE 9 MG/ML
1000 INJECTION, SOLUTION INTRAVENOUS
Refills: 0 | Status: DISCONTINUED | OUTPATIENT
Start: 2021-04-15 | End: 2021-04-15

## 2021-04-15 RX ORDER — HYDROMORPHONE HYDROCHLORIDE 2 MG/ML
1 INJECTION INTRAMUSCULAR; INTRAVENOUS; SUBCUTANEOUS ONCE
Refills: 0 | Status: DISCONTINUED | OUTPATIENT
Start: 2021-04-15 | End: 2021-04-15

## 2021-04-15 RX ORDER — CITRIC ACID/SODIUM CITRATE 300-500 MG
30 SOLUTION, ORAL ORAL ONCE
Refills: 0 | Status: DISCONTINUED | OUTPATIENT
Start: 2021-04-15 | End: 2021-04-15

## 2021-04-15 RX ORDER — HYDROMORPHONE HYDROCHLORIDE 2 MG/ML
0.5 INJECTION INTRAMUSCULAR; INTRAVENOUS; SUBCUTANEOUS
Refills: 0 | Status: DISCONTINUED | OUTPATIENT
Start: 2021-04-15 | End: 2021-04-15

## 2021-04-15 RX ORDER — SIMETHICONE 80 MG/1
80 TABLET, CHEWABLE ORAL EVERY 4 HOURS
Refills: 0 | Status: DISCONTINUED | OUTPATIENT
Start: 2021-04-15 | End: 2021-04-18

## 2021-04-15 RX ORDER — OXYCODONE HYDROCHLORIDE 5 MG/1
5 TABLET ORAL ONCE
Refills: 0 | Status: DISCONTINUED | OUTPATIENT
Start: 2021-04-15 | End: 2021-04-18

## 2021-04-15 RX ORDER — IBUPROFEN 200 MG
600 TABLET ORAL EVERY 6 HOURS
Refills: 0 | Status: COMPLETED | OUTPATIENT
Start: 2021-04-15 | End: 2022-03-14

## 2021-04-15 RX ORDER — ACETAMINOPHEN 500 MG
1000 TABLET ORAL ONCE
Refills: 0 | Status: COMPLETED | OUTPATIENT
Start: 2021-04-15 | End: 2021-04-15

## 2021-04-15 RX ORDER — DIPHENHYDRAMINE HCL 50 MG
25 CAPSULE ORAL EVERY 6 HOURS
Refills: 0 | Status: DISCONTINUED | OUTPATIENT
Start: 2021-04-15 | End: 2021-04-18

## 2021-04-15 RX ORDER — NALOXONE HYDROCHLORIDE 4 MG/.1ML
0.1 SPRAY NASAL
Refills: 0 | Status: DISCONTINUED | OUTPATIENT
Start: 2021-04-15 | End: 2021-04-18

## 2021-04-15 RX ADMIN — Medication 1000 MILLIGRAM(S): at 18:21

## 2021-04-15 RX ADMIN — Medication 1000 MILLIUNIT(S)/MIN: at 19:14

## 2021-04-15 RX ADMIN — Medication 400 MILLIGRAM(S): at 20:17

## 2021-04-15 RX ADMIN — Medication 30 MILLIGRAM(S): at 19:06

## 2021-04-15 RX ADMIN — Medication 1000 MILLIUNIT(S)/MIN: at 21:03

## 2021-04-15 RX ADMIN — SODIUM CHLORIDE 125 MILLILITER(S): 9 INJECTION, SOLUTION INTRAVENOUS at 12:07

## 2021-04-15 RX ADMIN — Medication 2 MILLIUNIT(S)/MIN: at 12:07

## 2021-04-15 RX ADMIN — Medication 1000 MILLIGRAM(S): at 21:04

## 2021-04-15 RX ADMIN — HYDROMORPHONE HYDROCHLORIDE 1 MILLIGRAM(S): 2 INJECTION INTRAMUSCULAR; INTRAVENOUS; SUBCUTANEOUS at 19:00

## 2021-04-15 RX ADMIN — Medication 400 MILLIGRAM(S): at 14:00

## 2021-04-15 RX ADMIN — HYDROMORPHONE HYDROCHLORIDE 1 MILLIGRAM(S): 2 INJECTION INTRAMUSCULAR; INTRAVENOUS; SUBCUTANEOUS at 19:17

## 2021-04-16 LAB
BASOPHILS # BLD AUTO: 0.04 K/UL — SIGNIFICANT CHANGE UP (ref 0–0.2)
BASOPHILS NFR BLD AUTO: 0.3 % — SIGNIFICANT CHANGE UP (ref 0–2)
EOSINOPHIL # BLD AUTO: 0.03 K/UL — SIGNIFICANT CHANGE UP (ref 0–0.5)
EOSINOPHIL NFR BLD AUTO: 0.3 % — SIGNIFICANT CHANGE UP (ref 0–6)
HCT VFR BLD CALC: 29 % — LOW (ref 34.5–45)
HGB BLD-MCNC: 9.6 G/DL — LOW (ref 11.5–15.5)
IMM GRANULOCYTES NFR BLD AUTO: 0.3 % — SIGNIFICANT CHANGE UP (ref 0–1.5)
LYMPHOCYTES # BLD AUTO: 1.28 K/UL — SIGNIFICANT CHANGE UP (ref 1–3.3)
LYMPHOCYTES # BLD AUTO: 10.7 % — LOW (ref 13–44)
MCHC RBC-ENTMCNC: 27.9 PG — SIGNIFICANT CHANGE UP (ref 27–34)
MCHC RBC-ENTMCNC: 33.1 GM/DL — SIGNIFICANT CHANGE UP (ref 32–36)
MCV RBC AUTO: 84.3 FL — SIGNIFICANT CHANGE UP (ref 80–100)
MONOCYTES # BLD AUTO: 0.91 K/UL — HIGH (ref 0–0.9)
MONOCYTES NFR BLD AUTO: 7.6 % — SIGNIFICANT CHANGE UP (ref 2–14)
NEUTROPHILS # BLD AUTO: 9.67 K/UL — HIGH (ref 1.8–7.4)
NEUTROPHILS NFR BLD AUTO: 80.8 % — HIGH (ref 43–77)
PLATELET # BLD AUTO: 172 K/UL — SIGNIFICANT CHANGE UP (ref 150–400)
RBC # BLD: 3.44 M/UL — LOW (ref 3.8–5.2)
RBC # FLD: 15 % — HIGH (ref 10.3–14.5)
WBC # BLD: 11.97 K/UL — HIGH (ref 3.8–10.5)
WBC # FLD AUTO: 11.97 K/UL — HIGH (ref 3.8–10.5)

## 2021-04-16 RX ORDER — ENOXAPARIN SODIUM 100 MG/ML
40 INJECTION SUBCUTANEOUS EVERY 24 HOURS
Refills: 0 | Status: DISCONTINUED | OUTPATIENT
Start: 2021-04-16 | End: 2021-04-18

## 2021-04-16 RX ORDER — IBUPROFEN 200 MG
600 TABLET ORAL EVERY 6 HOURS
Refills: 0 | Status: DISCONTINUED | OUTPATIENT
Start: 2021-04-16 | End: 2021-04-18

## 2021-04-16 RX ADMIN — Medication 30 MILLIGRAM(S): at 12:47

## 2021-04-16 RX ADMIN — Medication 975 MILLIGRAM(S): at 03:14

## 2021-04-16 RX ADMIN — Medication 975 MILLIGRAM(S): at 04:05

## 2021-04-16 RX ADMIN — Medication 30 MILLIGRAM(S): at 00:28

## 2021-04-16 RX ADMIN — Medication 30 MILLIGRAM(S): at 07:03

## 2021-04-16 RX ADMIN — Medication 975 MILLIGRAM(S): at 16:22

## 2021-04-16 RX ADMIN — ENOXAPARIN SODIUM 40 MILLIGRAM(S): 100 INJECTION SUBCUTANEOUS at 09:20

## 2021-04-16 RX ADMIN — Medication 975 MILLIGRAM(S): at 21:40

## 2021-04-16 RX ADMIN — Medication 975 MILLIGRAM(S): at 20:57

## 2021-04-16 RX ADMIN — Medication 30 MILLIGRAM(S): at 06:30

## 2021-04-16 RX ADMIN — Medication 30 MILLIGRAM(S): at 01:00

## 2021-04-16 RX ADMIN — SODIUM CHLORIDE 125 MILLILITER(S): 9 INJECTION, SOLUTION INTRAVENOUS at 04:39

## 2021-04-16 RX ADMIN — OXYCODONE HYDROCHLORIDE 5 MILLIGRAM(S): 5 TABLET ORAL at 18:29

## 2021-04-16 RX ADMIN — Medication 600 MILLIGRAM(S): at 23:54

## 2021-04-16 RX ADMIN — ONDANSETRON 4 MILLIGRAM(S): 8 TABLET, FILM COATED ORAL at 00:18

## 2021-04-16 RX ADMIN — Medication 975 MILLIGRAM(S): at 09:00
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
## 2021-04-17 RX ADMIN — Medication 975 MILLIGRAM(S): at 09:22

## 2021-04-17 RX ADMIN — ENOXAPARIN SODIUM 40 MILLIGRAM(S): 100 INJECTION SUBCUTANEOUS at 09:22

## 2021-04-17 RX ADMIN — Medication 975 MILLIGRAM(S): at 03:00

## 2021-04-17 RX ADMIN — MAGNESIUM HYDROXIDE 30 MILLILITER(S): 400 TABLET, CHEWABLE ORAL at 20:59

## 2021-04-17 RX ADMIN — Medication 975 MILLIGRAM(S): at 15:04

## 2021-04-17 RX ADMIN — Medication 600 MILLIGRAM(S): at 05:45

## 2021-04-17 RX ADMIN — Medication 600 MILLIGRAM(S): at 00:30

## 2021-04-17 RX ADMIN — Medication 975 MILLIGRAM(S): at 21:30

## 2021-04-17 RX ADMIN — Medication 975 MILLIGRAM(S): at 02:30

## 2021-04-17 RX ADMIN — Medication 600 MILLIGRAM(S): at 11:57

## 2021-04-17 RX ADMIN — Medication 600 MILLIGRAM(S): at 18:07

## 2021-04-17 RX ADMIN — Medication 600 MILLIGRAM(S): at 06:30

## 2021-04-17 RX ADMIN — Medication 600 MILLIGRAM(S): at 23:35

## 2021-04-17 RX ADMIN — Medication 975 MILLIGRAM(S): at 20:47

## 2021-04-17 RX ADMIN — Medication 975 MILLIGRAM(S): at 10:00

## 2021-04-17 RX ADMIN — Medication 600 MILLIGRAM(S): at 13:00

## 2021-04-18 ENCOUNTER — TRANSCRIPTION ENCOUNTER (OUTPATIENT)
Age: 36
End: 2021-04-18

## 2021-04-18 VITALS
TEMPERATURE: 97 F | OXYGEN SATURATION: 96 % | SYSTOLIC BLOOD PRESSURE: 126 MMHG | RESPIRATION RATE: 18 BRPM | HEART RATE: 88 BPM | DIASTOLIC BLOOD PRESSURE: 57 MMHG

## 2021-04-18 RX ORDER — IBUPROFEN 200 MG
3 TABLET ORAL
Qty: 168 | Refills: 0
Start: 2021-04-18 | End: 2021-05-01

## 2021-04-18 RX ORDER — OXYCODONE HYDROCHLORIDE 5 MG/1
1 TABLET ORAL
Qty: 12 | Refills: 0
Start: 2021-04-18 | End: 2021-04-20

## 2021-04-18 RX ORDER — ACETAMINOPHEN 500 MG
3 TABLET ORAL
Qty: 360 | Refills: 0
Start: 2021-04-18 | End: 2021-05-17

## 2021-04-18 RX ORDER — TETANUS TOXOID, REDUCED DIPHTHERIA TOXOID AND ACELLULAR PERTUSSIS VACCINE, ADSORBED 5; 2.5; 8; 8; 2.5 [IU]/.5ML; [IU]/.5ML; UG/.5ML; UG/.5ML; UG/.5ML
0.5 SUSPENSION INTRAMUSCULAR ONCE
Refills: 0 | Status: COMPLETED | OUTPATIENT
Start: 2021-04-18 | End: 2021-04-18

## 2021-04-18 RX ADMIN — SIMETHICONE 80 MILLIGRAM(S): 80 TABLET, CHEWABLE ORAL at 11:06

## 2021-04-18 RX ADMIN — Medication 600 MILLIGRAM(S): at 06:37

## 2021-04-18 RX ADMIN — TETANUS TOXOID, REDUCED DIPHTHERIA TOXOID AND ACELLULAR PERTUSSIS VACCINE, ADSORBED 0.5 MILLILITER(S): 5; 2.5; 8; 8; 2.5 SUSPENSION INTRAMUSCULAR at 06:37

## 2021-04-18 RX ADMIN — Medication 975 MILLIGRAM(S): at 02:32

## 2021-04-18 RX ADMIN — Medication 600 MILLIGRAM(S): at 00:20

## 2021-04-18 RX ADMIN — Medication 600 MILLIGRAM(S): at 11:07

## 2021-04-18 RX ADMIN — Medication 600 MILLIGRAM(S): at 12:00

## 2021-04-18 RX ADMIN — ENOXAPARIN SODIUM 40 MILLIGRAM(S): 100 INJECTION SUBCUTANEOUS at 08:39

## 2021-04-18 RX ADMIN — MAGNESIUM HYDROXIDE 30 MILLILITER(S): 400 TABLET, CHEWABLE ORAL at 11:13

## 2021-04-18 RX ADMIN — Medication 975 MILLIGRAM(S): at 08:39

## 2021-04-18 RX ADMIN — Medication 975 MILLIGRAM(S): at 09:30

## 2021-04-18 RX ADMIN — Medication 600 MILLIGRAM(S): at 07:10

## 2021-04-18 RX ADMIN — Medication 975 MILLIGRAM(S): at 03:10

## 2021-04-18 NOTE — DISCHARGE NOTE OB - PATIENT PORTAL LINK FT
You can access the FollowMyHealth Patient Portal offered by [facility] by registering at the following website: [email or web address]. By joining [employer]’s FollowMyHealth portal, you will also be able to view your health information using other applications (apps) compatible with our system.

## 2021-04-18 NOTE — DISCHARGE NOTE OB - CARE PROVIDER_API CALL
Lona Denny)  Obstetrics and Gynecology  [address], [address]  [place] [address]  Phone: [phone]  Fax: [phone]  Follow Up Time: 1 week

## 2021-04-18 NOTE — DISCHARGE NOTE OB - ADDITIONAL INSTRUCTIONS
Please call your provider in 1-2 weeks for wound check. Take medications as directed, regular diet, activity as tolerated. Exclusive breast feeding for the first 6 months is recommended. Nothing per vagina for 6 weeks (incl. sex, douching, etc). If you have additional concerns, please inform your provider.

## 2021-04-18 NOTE — DISCHARGE NOTE OB - HOSPITAL COURSE
delivered via  section. She was transferred to postpartum unit without complications during her stay. Upon discharge she is voiding, tolerating PO, ambulating, and pain is controlled.

## 2021-04-18 NOTE — DISCHARGE NOTE OB - MEDICATION SUMMARY - MEDICATIONS TO TAKE
I will START or STAY ON the medications listed below when I get home from the hospital:    ibuprofen 200 mg oral tablet  -- 3 tab(s) by mouth every 6 hours, As Needed -for mild pain. Alternate every 3 hours with Tylenol if pain medication is needed.  -- Do not take this drug if you are pregnant.  It is very important that you take or use this exactly as directed.  Do not skip doses or discontinue unless directed by your doctor.  May cause drowsiness or dizziness.  Obtain medical advice before taking any non-prescription drugs as some may affect the action of this medication.  Take with food or milk.    -- Indication: For pain    Tylenol 325 mg oral tablet  -- 3 tab(s) by mouth every 6 hours, As Needed -for mild pain. Alternate every 3 hours with Ibuprofen if you need pain medications.  -- This product contains acetaminophen.  Do not use  with any other product containing acetaminophen to prevent possible liver damage.    -- Indication: For pain    oxyCODONE 5 mg oral tablet  -- 1 tab(s) by mouth every 6 hours, As Needed -for severe pain MDD:4 tabs  -- Caution federal law prohibits the transfer of this drug to any person other  than the person for whom it was prescribed.  It is very important that you take or use this exactly as directed.  Do not skip doses or discontinue unless directed by your doctor.  May cause drowsiness or dizziness.  This prescription cannot be refilled.  Using more of this medication than prescribed may cause serious breathing problems.    -- Indication: For severe pain

## 2021-04-18 NOTE — DISCHARGE NOTE OB - PLAN OF CARE
delivered via  section. She was transferred to postpartum unit without complications during her stay. Upon discharge she is voiding, tolerating PO, ambulating, and pain is controlled. recovery
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
## 2022-10-04 ENCOUNTER — EMERGENCY (EMERGENCY)
Facility: HOSPITAL | Age: 37
LOS: 1 days | Discharge: HOME | End: 2022-10-04
Attending: EMERGENCY MEDICINE | Admitting: EMERGENCY MEDICINE
Payer: COMMERCIAL

## 2022-10-04 VITALS
OXYGEN SATURATION: 100 % | RESPIRATION RATE: 18 BRPM | TEMPERATURE: 98 F | DIASTOLIC BLOOD PRESSURE: 83 MMHG | HEART RATE: 77 BPM | WEIGHT: 145.06 LBS | SYSTOLIC BLOOD PRESSURE: 124 MMHG | HEIGHT: 72 IN

## 2022-10-04 VITALS
SYSTOLIC BLOOD PRESSURE: 116 MMHG | RESPIRATION RATE: 15 BRPM | HEART RATE: 82 BPM | TEMPERATURE: 98 F | DIASTOLIC BLOOD PRESSURE: 83 MMHG | OXYGEN SATURATION: 99 %

## 2022-10-04 PROCEDURE — 72050 X-RAY EXAM NECK SPINE 4/5VWS: CPT | Mod: 26

## 2022-10-04 PROCEDURE — 72050 X-RAY EXAM NECK SPINE 4/5VWS: CPT

## 2022-10-04 PROCEDURE — 99284 EMERGENCY DEPT VISIT MOD MDM: CPT

## 2022-10-04 PROCEDURE — 99283 EMERGENCY DEPT VISIT LOW MDM: CPT | Mod: 25

## 2022-10-04 RX ORDER — METHOCARBAMOL 500 MG/1
1000 TABLET, FILM COATED ORAL ONCE
Refills: 0 | Status: COMPLETED | OUTPATIENT
Start: 2022-10-04 | End: 2022-10-04

## 2022-10-04 RX ORDER — LIDOCAINE 4 G/100G
1 CREAM TOPICAL ONCE
Refills: 0 | Status: COMPLETED | OUTPATIENT
Start: 2022-10-04 | End: 2022-10-04

## 2022-10-04 RX ORDER — METHOCARBAMOL 500 MG/1
1 TABLET, FILM COATED ORAL
Qty: 20 | Refills: 0
Start: 2022-10-04 | End: 2022-10-08

## 2022-10-04 RX ORDER — IBUPROFEN 200 MG
600 TABLET ORAL ONCE
Refills: 0 | Status: COMPLETED | OUTPATIENT
Start: 2022-10-04 | End: 2022-10-04

## 2022-10-04 RX ORDER — IBUPROFEN 200 MG
1 TABLET ORAL
Qty: 15 | Refills: 0
Start: 2022-10-04 | End: 2022-10-08

## 2022-10-04 RX ADMIN — METHOCARBAMOL 1000 MILLIGRAM(S): 500 TABLET, FILM COATED ORAL at 20:46

## 2022-10-04 RX ADMIN — LIDOCAINE 1 PATCH: 4 CREAM TOPICAL at 20:50

## 2022-10-04 RX ADMIN — Medication 600 MILLIGRAM(S): at 20:46
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
## 2022-10-04 NOTE — ED PROVIDER NOTE - NS ED ATTENDING STATEMENT MOD
This was a shared visit with the RISA. I reviewed and verified the documentation and independently performed the documented:

## 2022-10-04 NOTE — ED PROVIDER NOTE - PATIENT PORTAL LINK FT
You can access the FollowMyHealth Patient Portal offered by [facility] by registering at the following website: [email or web address]. By joining [employer]s FollowMyHealth portal, you will also be able to view your health information using other applications (apps) compatible with our system.

## 2022-10-04 NOTE — ED PROVIDER NOTE - MUSCULOSKELETAL MINIMAL EXAM
+ TTP left sided paracervical tenderness with FROM of neck. no midline cervical tenderness. no chest wall tenderness. no midline thoracic/lumbar tenderness. no pelvic tenderness. FROM of all extremities. radial and dp pulses equal and intact bilaterally.
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
## 2022-10-04 NOTE — ED PROVIDER NOTE - CARE PROVIDER_API CALL
Steve Morton)  Orthopaedic Surgery  [address] [place] [address], [address] [place] [address]  Phone: [phone]  Fax: [phone]  Follow Up Time: 1-3 Days
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
## 2022-10-04 NOTE — ED PROVIDER NOTE - WR ORDER NAME 1
Addended by: FREDY ALONSO on: 4/21/2022 03:41 PM     Modules accepted: Orders    
Xray Cervical Spine 4 or 5 Views

## 2022-12-12 NOTE — ED ADULT TRIAGE NOTE - HISTORY OF COVID-19 VACCINATION
Progress Note      SUBJECTIVE     Rapid response  Due to hypotension and lethargy      Inpatient Meds  Current Facility-Administered Medications   Medication Dose Route Frequency Provider Last Rate Last Admin   â¢ atenolol (TENORMIN) tablet 25 mg  25 mg Oral Daily Rainer Garcia DO       â¢ sodium chloride (NORMAL SALINE) 0.9 % bolus 500 mL  500 mL Intravenous Once Sagrario Escamilla MD       â¢ dextrose 5 % / sodium chloride 0.45% with KCl 20 mEq infusion   Intravenous Continuous Sagrario Escamilla MD       â¢ sodium chloride 0.9 % flush bag 25 mL  25 mL Intravenous PRN Kiana Medrano MD       â¢ sodium chloride (PF) 0.9 % injection 2 mL  2 mL Intracatheter 2 times per day Kiana Medrano MD   2 mL at 12/12/22 0908   â¢ sodium chloride 0.9 % flush bag 25 mL  25 mL Intravenous PRN Kiana Medrano MD       â¢ enoxaparin (LOVENOX) injection 40 mg  40 mg Subcutaneous Daily Kiana Medrano MD   40 mg at 12/12/22 0903   â¢ dextrose (GLUTOSE) 40 % gel 15 g  15 g Oral PRN Kiana Medrano MD       â¢ dextrose 50 % injection 25 g  25 g Intravenous PRN Kiana Medrano MD       â¢ dextrose 50 % injection 12.5 g  12.5 g Intravenous PRN Kiana Medrano MD       â¢ glucagon (GLUCAGEN) injection 1 mg  1 mg Intramuscular PRN Kiana Medrano MD       â¢ dextrose (GLUTOSE) 40 % gel 30 g  30 g Oral PRN Kiana Medrano MD       â¢ Magnesium Standard Replacement Protocol   Does not apply See Rebekah Xavier MD       â¢ Potassium Standard Replacement Protocol (Levels 3.5 and lower)   Does not apply See Rebekah Xavier MD       â¢ ondansetron [facility]) injection 4 mg  4 mg Intravenous BID PRN Kiana Medrano MD       â¢ prochlorperazine (COMPAZINE) tablet 5 mg  5 mg Oral Q4H PRN Kiana Medrano MD       â¢ acetaminophen (TYLENOL) tablet 650 mg  650 mg Oral Q4H PRN Kiana Medrano MD       â¢ HYDROcodone-acetaminophen (NORCO) 5-325 MG per tablet 1 tablet  1 tablet Oral Q4H PRN Kiana Medrano MD       â¢ aluminum-magnesium hydroxide-simethicone (MAALOX) 836-631-90 MG/5ML suspension 30 mL  30 mL Oral Q4H PRN Aruna Willis MD       â¢ bisacodyl (DULCOLAX) suppository 10 mg  10 mg Rectal Daily PRN Aruna Willis MD       â¢ docusate sodium-sennosides (SENOKOT S) 50-8.6 MG 2 tablet  2 tablet Oral Daily PRN Aruna Willis MD       â¢ polyethylene glycol (MIRALAX) packet 17 g  17 g Oral Daily PRN Aruna Willis MD       â¢ potassium CHLORIDE (KLOR-CON M) josé miguel ER tablet 40 mEq  40 mEq Oral Once Kade Trujillo MD       â¢ atorvastatin (LIPITOR) tablet 40 mg  40 mg Oral Nightly Jane Penaloza MD   40 mg at 12/11/22 2215   â¢ dronabinol (MARINOL) capsule 2.5 mg  2.5 mg Oral BID AC Jane Penaloza MD   2.5 mg at 12/12/22 0617   â¢ potassium CHLORIDE (KLOR-CON M) josé miguel ER tablet 20 mEq  20 mEq Oral Daily Jane Penaloza MD   20 mEq at 12/12/22 0902         [address] [place] [address] Road:     Vital Last Value 24 Hour Range   Temperature 97.9 Â°F (36.6 Â°C) (12/12/22 1110) Temp  Min: 97.4 Â°F (36.3 Â°C)  Max: 98.2 Â°F (36.8 Â°C)   Pulse (!) 104 (12/12/22 1110) Pulse  Min: 104  Max: 121   Respiratory 18 (12/12/22 1110) Resp  Min: 18  Max: 18   Non-Invasive  Blood Pressure 115/76 (12/12/22 1110) BP  Min: 115/76  Max: 148/73   Pulse Oximetry 92 % (12/12/22 1110) SpO2  Min: 92 %  Max: 97 %       IINTAKE/OUTPUT:  I/O last 3 completed shifts: In: 1000 [P.O.:300; IV Piggyback:700]  Out: 1050 [Urine:1050]  No intake/output data recorded. Intake/Output Summary (Last 24 hours) at 12/12/2022 1443  Last data filed at 12/12/2022 0600  Gross per 24 hour   Intake 1000 ml   Output 750 ml   Net 250 ml       PHYSICAL EXAM  General: Lethargic, confused, thin  Head: Â No signs of head trauma. Eyes: Â Pupils are equal. Â Extraocular motions intact. Â   Ears: Â Hearing grossly intact. Mouth: Â Oropharynx is normal.   Neck: Â No adenopathy, no JVD. Â Â   Chest: Â Chest with clear breath sounds bilaterally. Â No wheezes, rales, or rhonchi. Â   Cardiac: Â Regular rate and rhythm. Â S1 and S2, without murmurs, gallops, or rubs.   Abdomen: Â Soft, without detectable tenderness. Â No sign of distention. Â No Â Â rebound or guarding, and no masses palpated. Â Â Bowel Sounds normal.  Musculoskeletal: Â Good range of motion of all major joints. Extremities without clubbing, cyanosis or edema. Vascular: Â No Edema. Â Peripheral pulses normal and equal in all extremities. Â   Neuro: Â Alert; oriented to self and year,not place. Â No focal sensory or strength deficits. Â Â Speech normal. Â Follows commands. Psychiatric: Â slow at responding  Skin: Â No rash or lesions. Â     LABS    Recent Results (from the past 24 hour(s))   Magnesium    Collection Time: 12/12/22  4:57 AM   Result Value Ref Range    Magnesium 1.3 (L) 1.7 - 2.4 mg/dL   Vitamin B12 And Folate    Collection Time: 12/12/22  4:57 AM   Result Value Ref Range    Vitamin B12 >2,000 (H) 211 - 911 pg/mL    Folate 7.9 >=5.5 ng/mL   Comprehensive Metabolic Panel    Collection Time: 12/12/22  4:57 AM   Result Value Ref Range    Fasting Status      Sodium 143 135 - 145 mmol/L    Potassium 3.5 3.4 - 5.1 mmol/L    Chloride 112 (H) 97 - 110 mmol/L    Carbon Dioxide 17 (L) 21 - 32 mmol/L    Anion Gap 18 7 - 19 mmol/L    Glucose 78 70 - 99 mg/dL    BUN 7 6 - 20 mg/dL    Creatinine 0.47 (L) 0.51 - 0.95 mg/dL    Glomerular Filtration Rate >90 >=60    BUN/ Creatinine Ratio 15 7 - 25    Calcium 10.1 8.4 - 10.2 mg/dL    Bilirubin, Total 0.8 0.2 - 1.0 mg/dL    GOT/AST 41 (H) <=37 Units/L    GPT/ALT 22 <64 Units/L    Alkaline Phosphatase 241 (H) 45 - 117 Units/L    Albumin 2.9 (L) 3.6 - 5.1 g/dL    Protein, Total 6.7 6.4 - 8.2 g/dL    Globulin 3.8 2.0 - 4.0 g/dL    A/G Ratio 0.8 (L) 1.0 - 2.4   CBC with Automated Differential (performable only)    Collection Time: 12/12/22  4:57 AM   Result Value Ref Range    WBC 5.7 4.2 - 11.0 K/mcL    RBC 3.49 (L) 4.00 - 5.20 mil/mcL    HGB 10.1 (L) 12.0 - 15.5 g/dL    HCT 31.0 (L) 36.0 - 46.5 %    MCV 88.8 78.0 - 100.0 fl    MCH 28.9 26.0 - 34.0 pg    MCHC 32.6 32.0 - 36.5 g/dL    RDW-CV 13.2 11.0 - 15.0 %    RDW-SD 43.4 "39.0 - 50.0 fL     140 - 450 K/mcL    NRBC 0 <=0 /100 WBC    Neutrophil, Percent 58 %    Lymphocytes, Percent 27 %    Mono, Percent 10 %    Eosinophils, Percent 4 %    Basophils, Percent 1 %    Immature Granulocytes 0 %    Absolute Neutrophils 3.3 1.8 - 7.7 K/mcL    Absolute Lymphocytes 1.5 1.0 - 4.0 K/mcL    Absolute Monocytes 0.6 0.3 - 0.9 K/mcL    Absolute Eosinophils  0.3 0.0 - 0.5 K/mcL    Absolute Basophils 0.0 0.0 - 0.3 K/mcL    Absolute Immmature Granulocytes 0.0 0.0 - 0.2 K/mcL   GLUCOSE, BEDSIDE - POINT OF CARE    Collection Time: 12/12/22  2:30 PM   Result Value Ref Range    GLUCOSE, BEDSIDE - POINT OF CARE 71 70 - 99 mg/dL   GLUCOSE, BEDSIDE - POINT OF CARE    Collection Time: 12/12/22  2:38 PM   Result Value Ref Range    GLUCOSE, BEDSIDE - POINT OF CARE 94 70 - 99 mg/dL       UA  Lab Results   Component Value Date    UWBC Negative 12/10/2022    URBC Trace (A) 12/10/2022        IMAGING  No results found. ASSESSMENT AND PLAN      70year old female with a h/o HTN, DM, HLD, stage IV NSCLC and been on  maintenance pembrolizumab- currently on hold; who presented 12/10   w 1 month h/o weakness, recurrent falls, poor oral intake, confusion,  intermittent vomiting,  weight loss, ""pain all over\"". 20 lbs weight loss over 1 month per granddaughter. Â She has been found on the floor by her neighbor multiple times in the last week. Â Â No fevers no chest pain no difficulty breathing no dysuria or hematuria no nausea or vomiting no diarrhea. A month ago she was completely alert, mobile and living independently Â       Hypotension  -Rapid response  Called for sbp 70. BS 71.  on monitor. Improved when placed in [place] to 120's. More alert but still confused. 25 cc D50 given. Bolus  cc and cont D51/2 ns w  20 KCL  After that 100 cc/hour.  Rate can be adjusted in am.    Stage IV NSCLC  -Been on  maintenance pembrolizumab and per PET scan was responding to therapy- currently on hold due to " arthritis sx  -Dr Pamela Michelle on consult  Â   Acute metabolic encephalopathy  Recurrent falls/ weakness/ rapid weight loss  -Suspect multifactorial including acute malnutrition, tyrotoxicosis, hypercalcemia but very rapid deterioration for unclear reasons when pt's malignancy was responding to immunotherapy.    -CT brain w/o acute findings.   -Labs mainly remarkable for mild hypercalcemia that resolved w IVF  -Dietician consulted  -Endocrine consulted  -Encourage po intake and give IVF prn  -PT/OT  Â   HTN  -Hold ARB/HCTZ and would avoid hctz moving forward in light of hypercalcemia  Â   DM  -Per chart but not on meds  Â   HLD  -Hold statin for now    Thyrotoxicosis  -Will consult endocrine  Â   Hypercalcemia  -Resolved w IVF  -Could be from malignancy, hctz, thyrotoxicosis  -Monitor  Â   Hypokalemia  -Repleted    Hypomagnesemia  -Replete    DVT ppx: Lovenox      Time > 35 min of   Crit care time     I updated daughter at bedside  Baylee Ding MD Vaccine status unknown

## 2024-04-06 NOTE — ED ADULT NURSE NOTE - NS ED PATIENT SAFETY CONCERN
Daily Call Note: daily outreach call completed with patient, she states she is doing well, she feels good, states occasional cough , with small amt frothy sputum, denies any sob uses o2@ hs per nasal canula, will use @ times during day if feeling sob states no tightness or heaviness in chest rbs 196 this am . Next [facility] scheduled 4/8 @ 1300 , should be ready to graduate.    Pt Education: continue self care  Barriers:   Topics for Daily Review: weight ,rbs  Pt demonstrates clear understanding: Yes    Daily Weight:  There were no vitals filed for this visit.   Last 3 Weights:  Wt Readings from Last 7 Encounters:   03/15/24 79.8 kg (176 lb)   03/11/24 80.7 kg (178 lb)   03/11/24 78.9 kg (174 lb)   03/03/24 81.8 kg (180 lb 5.4 oz)   02/02/24 83.9 kg (184 lb 14.4 oz)   01/03/24 84.8 kg (187 lb)   10/11/23 81.6 kg (180 lb)       Masimo Device: No   Masimo Clinical Impression:     Virtual Visits--Scheduled (Most Recent Date at Top)  Follow up Appointments  Recent Visits  Date Type Provider Dept   03/11/24 Office Visit Raghav Garnett, DO [id number]   Showing recent visits within past 30 days and meeting all other requirements  Future Appointments  Date Type Provider Dept   06/10/24 Appointment Raghav Garnett, DO [id number]   Showing future appointments within next 90 days and meeting all other requirements       Frequency of RN Calls & Virtual Visits per Team Agreement: Healthy at Home Frequency: T/TH/Sat          [facility] Transitions Assessment    Patient's Address:   [address] [place] [id number]  **  If this is not the address patient will receive services - alert team and address in EMR**       Patient Contacts:  Extended Emergency Contact Information  Primary Emergency Contact: Shayan Thompson  Home Phone: [phone]  Work Phone: [phone]  Relation: Spouse                                Patient's Preferred Phone: [phone]  Patient's E-mail:  [email or web address]                                       No

## 2024-12-06 NOTE — ED ADULT TRIAGE NOTE - DOMESTIC TRAVEL HIGH RISK QUESTION
Pls see about scheduling Band removal for December - pt has met deductible, hopefully we can squeeze in before end of year- maybe 12/31 or 12/30 in morning before clinic  Pls address ASAP - I will work on completing my notes over weekend for prior auth No
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
